# Patient Record
Sex: FEMALE | Race: WHITE | NOT HISPANIC OR LATINO | Employment: FULL TIME | URBAN - METROPOLITAN AREA
[De-identification: names, ages, dates, MRNs, and addresses within clinical notes are randomized per-mention and may not be internally consistent; named-entity substitution may affect disease eponyms.]

---

## 2019-05-06 ENCOUNTER — OFFICE VISIT (OUTPATIENT)
Dept: OBSTETRICS AND GYNECOLOGY | Age: 59
End: 2019-05-06

## 2019-05-06 VITALS
DIASTOLIC BLOOD PRESSURE: 60 MMHG | WEIGHT: 120 LBS | SYSTOLIC BLOOD PRESSURE: 102 MMHG | BODY MASS INDEX: 22.66 KG/M2 | HEIGHT: 61 IN

## 2019-05-06 DIAGNOSIS — Z12.4 PAP SMEAR FOR CERVICAL CANCER SCREENING: ICD-10-CM

## 2019-05-06 DIAGNOSIS — F17.200 SMOKER UNMOTIVATED TO QUIT: ICD-10-CM

## 2019-05-06 DIAGNOSIS — Z01.419 WELL FEMALE EXAM WITH ROUTINE GYNECOLOGICAL EXAM: Primary | ICD-10-CM

## 2019-05-06 DIAGNOSIS — Z13.89 SCREENING FOR BLOOD OR PROTEIN IN URINE: ICD-10-CM

## 2019-05-06 LAB
BILIRUB BLD-MCNC: NEGATIVE MG/DL
CLARITY, POC: CLEAR
COLOR UR: YELLOW
GLUCOSE UR STRIP-MCNC: NEGATIVE MG/DL
KETONES UR QL: NEGATIVE
LEUKOCYTE EST, POC: NEGATIVE
NITRITE UR-MCNC: NEGATIVE MG/ML
PH UR: 5.5 [PH] (ref 5–8)
PROT UR STRIP-MCNC: NEGATIVE MG/DL
RBC # UR STRIP: NEGATIVE /UL
SP GR UR: 1.02 (ref 1–1.03)
UROBILINOGEN UR QL: NORMAL

## 2019-05-06 PROCEDURE — 99396 PREV VISIT EST AGE 40-64: CPT | Performed by: OBSTETRICS & GYNECOLOGY

## 2019-05-06 PROCEDURE — 81002 URINALYSIS NONAUTO W/O SCOPE: CPT | Performed by: OBSTETRICS & GYNECOLOGY

## 2019-05-09 LAB
CYTOLOGIST CVX/VAG CYTO: NORMAL
CYTOLOGY CVX/VAG DOC CYTO: NORMAL
CYTOLOGY CVX/VAG DOC THIN PREP: NORMAL
DX ICD CODE: NORMAL
HIV 1 & 2 AB SER-IMP: NORMAL
HPV I/H RISK 4 DNA CVX QL PROBE+SIG AMP: NEGATIVE
Lab: NORMAL
OTHER STN SPEC: NORMAL
STAT OF ADQ CVX/VAG CYTO-IMP: NORMAL

## 2019-05-10 ENCOUNTER — TELEPHONE (OUTPATIENT)
Dept: OBSTETRICS AND GYNECOLOGY | Age: 59
End: 2019-05-10

## 2020-12-14 ENCOUNTER — OFFICE VISIT (OUTPATIENT)
Dept: OBSTETRICS AND GYNECOLOGY | Age: 60
End: 2020-12-14

## 2020-12-14 ENCOUNTER — RESULTS ENCOUNTER (OUTPATIENT)
Dept: OBSTETRICS AND GYNECOLOGY | Age: 60
End: 2020-12-14

## 2020-12-14 VITALS
SYSTOLIC BLOOD PRESSURE: 120 MMHG | DIASTOLIC BLOOD PRESSURE: 78 MMHG | WEIGHT: 141 LBS | HEIGHT: 61 IN | BODY MASS INDEX: 26.62 KG/M2

## 2020-12-14 DIAGNOSIS — Z01.419 WELL FEMALE EXAM WITH ROUTINE GYNECOLOGICAL EXAM: Primary | ICD-10-CM

## 2020-12-14 DIAGNOSIS — F17.200 SMOKER UNMOTIVATED TO QUIT: ICD-10-CM

## 2020-12-14 DIAGNOSIS — Z13.89 SCREENING FOR BLOOD OR PROTEIN IN URINE: ICD-10-CM

## 2020-12-14 DIAGNOSIS — J43.8 OTHER EMPHYSEMA (HCC): ICD-10-CM

## 2020-12-14 DIAGNOSIS — Z12.11 SCREENING FOR COLON CANCER: ICD-10-CM

## 2020-12-14 DIAGNOSIS — Z12.31 SCREENING MAMMOGRAM, ENCOUNTER FOR: ICD-10-CM

## 2020-12-14 PROBLEM — J44.9 COPD (CHRONIC OBSTRUCTIVE PULMONARY DISEASE): Status: RESOLVED | Noted: 2020-10-28 | Resolved: 2020-12-14

## 2020-12-14 PROBLEM — Z86.16 HISTORY OF 2019 NOVEL CORONAVIRUS DISEASE (COVID-19): Status: RESOLVED | Noted: 2020-10-28 | Resolved: 2020-12-14

## 2020-12-14 PROBLEM — J43.9 EMPHYSEMA OF LUNG (HCC): Status: ACTIVE | Noted: 2020-10-28

## 2020-12-14 PROBLEM — Z86.16 HISTORY OF 2019 NOVEL CORONAVIRUS DISEASE (COVID-19): Status: ACTIVE | Noted: 2020-10-28

## 2020-12-14 PROBLEM — J44.9 COPD (CHRONIC OBSTRUCTIVE PULMONARY DISEASE) (HCC): Status: ACTIVE | Noted: 2020-10-28

## 2020-12-14 LAB
BILIRUB BLD-MCNC: NEGATIVE MG/DL
CLARITY, POC: CLEAR
COLOR UR: YELLOW
GLUCOSE UR STRIP-MCNC: NEGATIVE MG/DL
KETONES UR QL: NEGATIVE
LEUKOCYTE EST, POC: ABNORMAL
NITRITE UR-MCNC: NEGATIVE MG/ML
PH UR: 5.5 [PH] (ref 5–8)
PROT UR STRIP-MCNC: NEGATIVE MG/DL
RBC # UR STRIP: NEGATIVE /UL
SP GR UR: 1.03 (ref 1–1.03)
UROBILINOGEN UR QL: NORMAL

## 2020-12-14 PROCEDURE — 81002 URINALYSIS NONAUTO W/O SCOPE: CPT | Performed by: OBSTETRICS & GYNECOLOGY

## 2020-12-14 PROCEDURE — 99396 PREV VISIT EST AGE 40-64: CPT | Performed by: OBSTETRICS & GYNECOLOGY

## 2020-12-14 NOTE — PROGRESS NOTES
Routine Annual Visit    2020    Patient: Justine Brower          MR#:[id number]    History of Present Illness    Chief Complaint   Patient presents with   • Gynecologic Exam     last pap 2019 neg, last MG 3/2019 normal, no c-scope       60 y.o. female  who presents for annual exam.    The patient presents for routine exam reporting her brother  a few months ago from [place].  The patient's quit smoking recently.    The patient reports some periodic urinary incontinence that seems worse in the last year.  She is not ready to do anything right now about it.      No LMP recorded (lmp unknown). Patient is postmenopausal.  Obstetric History:  OB History        1    Para   1    Term   1            AB        Living   1       SAB        TAB        Ectopic        Molar        Multiple        Live Births   1               Menstrual History:     No LMP recorded (lmp unknown). Patient is postmenopausal.       Sexual History:       ________________________________________  Patient Active Problem List   Diagnosis   • Smoker unmotivated to quit   • Emphysema of lung (CMS/HCC)     Past Medical History:   Diagnosis Date   • GERD (gastroesophageal reflux disease)    • History of 2019 novel coronavirus disease (COVID-19) 10/28/2020    2020   • LGSIL on Pap smear of cervix 02/15/1999   • Menopause    • Pap smear of cervix with ASCUS, cannot exclude HGSIL 1999   • Vaginal atrophy      Past Surgical History:   Procedure Laterality Date   • BREAST EXCISIONAL BIOPSY Left 1999    DR. HANSEN/[facility]   • CARPAL TUNNEL RELEASE Right    • COLPOSCOPY W/ BIOPSY / CURETTAGE  1999    chronic cervicitis   • D&C HYSTEROSCOPY  2006    DR. ROBIN/S   • ENDOMETRIAL BIOPSY  08/10/1999    secretory endometrium   • NOSE SURGERY     • TONSILLECTOMY     • TUBAL ABDOMINAL LIGATION       Social History     Tobacco Use   Smoking Status Former Smoker   • Packs/day: 1.00   • Years: 49.00  "  • Pack years: 49.00   • Types: Cigarettes   • Start date:    • Quit date:    • Years since quittin.9   Smokeless Tobacco Never Used     Family History   Problem Relation Age of Onset   • Hypertension Mother    • No Known Problems Father    • Heart attack Brother      Prior to Admission medications    Medication Sig Start Date End Date Taking? Authorizing Provider   albuterol sulfate  (90 Base) MCG/ACT inhaler  20  Yes Emergency, Nurse Valentín RN   omeprazole (priLOSEC) 20 MG capsule Take 20 mg by mouth Daily As Needed.   Yes Emergency, Nurse Valentín RN   rosuvastatin (CRESTOR) 10 MG tablet  20  Yes Emergency, Nurse LUKAS Laura     ________________________________________    Current contraception: post menopausal status  History of abnormal Pap smear: no  Family history of uterine or ovarian cancer: no  Family History of colon cancer/colon polyps: no  History of abnormal mammogram: no  History of abnormal lipids: yes - treated    The following portions of the patient's history were reviewed and updated as appropriate: allergies, current medications, past family history, past medical history, past social history, past surgical history and problem list.    Review of Systems    Pertinent items are noted in HPI.       Objective   Physical Exam    /78   Ht 154.9 cm (61\")   Wt 64 kg (141 lb)   LMP  (LMP Unknown)   Breastfeeding No   BMI 26.64 kg/m²    BP Readings from Last 3 Encounters:   20 120/78   20 141/83   20 154/89      Wt Readings from Last 3 Encounters:   20 64 kg (141 lb)   20 57.2 kg (126 lb)   20 57.2 kg (126 lb)        BMI: Estimated body mass index is 26.64 kg/m² as calculated from the following:    Height as of this encounter: 154.9 cm (61\").    Weight as of this encounter: 64 kg (141 lb).       General: alert, appears older than stated age and cooperative   Heart: regular rate and rhythm, S1, S2 normal, no murmur, click, rub or gallop "   Lungs: clear to auscultation bilaterally   Abdomen: soft, non-tender, without masses, no organomegaly   Breast: inspection negative, no nipple discharge or bleeding, no masses or nodularity palpable   External genitalia/Vulva: External genitalia including bartholin's glands, Urethra, [place]'s gland and urethra meatus are normal, Perineum, rectum and anus appear normal  and Bladder appears normal without significant prolapse    Vagina: normal mucosa, normal discharge   Cervix: no lesions   Uterus: normal size   Adnexa: normal adnexa     As part of wellness and prevention, the following topics were discussed with the patient:  Encouraged self breast exam  Physical activity and regular exercised encouraged.   Smoking cessation discussed.      Assessment:    Diagnoses and all orders for this visit:    1. Well female exam with routine gynecological exam (Primary)    2. Screening for colon cancer  -     Cologuard - Stool, Per Rectum; Future    3. Other emphysema (CMS/HCC)    4. Smoker unmotivated to quit    5. Screening for blood or protein in urine  -     POC Urinalysis Dipstick    6. Screening mammogram, encounter for        Plan:  No follow-ups on file.      Long Lawler MD  12/14/2020 13:25 EST

## 2020-12-16 LAB
CYTOLOGIST CVX/VAG CYTO: NORMAL
CYTOLOGY CVX/VAG DOC CYTO: NORMAL
CYTOLOGY CVX/VAG DOC THIN PREP: NORMAL
DX ICD CODE: NORMAL
HIV 1 & 2 AB SER-IMP: NORMAL
HPV I/H RISK 4 DNA CVX QL PROBE+SIG AMP: NEGATIVE
OTHER STN SPEC: NORMAL
STAT OF ADQ CVX/VAG CYTO-IMP: NORMAL

## 2022-11-17 ENCOUNTER — OFFICE VISIT (OUTPATIENT)
Dept: OBSTETRICS AND GYNECOLOGY | Age: 62
End: 2022-11-17

## 2022-11-17 VITALS
WEIGHT: 153.8 LBS | DIASTOLIC BLOOD PRESSURE: 78 MMHG | BODY MASS INDEX: 29.04 KG/M2 | HEIGHT: 61 IN | SYSTOLIC BLOOD PRESSURE: 124 MMHG

## 2022-11-17 DIAGNOSIS — Z01.411 ENCOUNTER FOR GYNECOLOGICAL EXAMINATION WITH ABNORMAL FINDING: Primary | ICD-10-CM

## 2022-11-17 DIAGNOSIS — Z12.4 SCREENING FOR MALIGNANT NEOPLASM OF CERVIX: ICD-10-CM

## 2022-11-17 DIAGNOSIS — Z11.51 SCREENING FOR HUMAN PAPILLOMAVIRUS (HPV): ICD-10-CM

## 2022-11-17 DIAGNOSIS — R39.15 URINARY URGENCY: ICD-10-CM

## 2022-11-17 DIAGNOSIS — N39.41 URGE URINARY INCONTINENCE: ICD-10-CM

## 2022-11-17 DIAGNOSIS — L98.8 SKIN MACULE: ICD-10-CM

## 2022-11-17 PROBLEM — K21.9 GASTROESOPHAGEAL REFLUX DISEASE: Status: ACTIVE | Noted: 2021-06-17

## 2022-11-17 PROBLEM — R91.1 PULMONARY NODULE LESS THAN 6 MM IN DIAMETER WITH LOW RISK FOR MALIGNANT NEOPLASM: Status: ACTIVE | Noted: 2021-09-01

## 2022-11-17 PROBLEM — J31.0 CHRONIC RHINITIS: Status: ACTIVE | Noted: 2021-06-17

## 2022-11-17 PROBLEM — E78.5 HYPERLIPIDEMIA: Status: ACTIVE | Noted: 2021-06-17

## 2022-11-17 PROBLEM — E04.1 THYROID NODULE: Status: ACTIVE | Noted: 2021-12-07

## 2022-11-17 PROBLEM — Z91.89 PULMONARY NODULE LESS THAN 6 MM IN DIAMETER WITH LOW RISK FOR MALIGNANT NEOPLASM: Status: ACTIVE | Noted: 2021-09-01

## 2022-11-17 PROCEDURE — 99396 PREV VISIT EST AGE 40-64: CPT | Performed by: NURSE PRACTITIONER

## 2022-11-17 PROCEDURE — 99213 OFFICE O/P EST LOW 20 MIN: CPT | Performed by: NURSE PRACTITIONER

## 2022-11-17 RX ORDER — LORATADINE 10 MG/1
10 TABLET ORAL DAILY
COMMUNITY

## 2022-11-17 RX ORDER — ACETAMINOPHEN 500 MG
500 TABLET ORAL
COMMUNITY
Start: 2022-05-29

## 2022-11-17 RX ORDER — FAMOTIDINE 20 MG/1
TABLET, FILM COATED ORAL
COMMUNITY
Start: 2022-11-10 | End: 2022-11-17

## 2022-11-17 RX ORDER — OMEPRAZOLE 40 MG/1
CAPSULE, DELAYED RELEASE ORAL
COMMUNITY
Start: 2022-11-09

## 2022-11-17 RX ORDER — NAPROXEN 500 MG/1
TABLET ORAL
COMMUNITY
Start: 2022-10-25

## 2022-11-17 RX ORDER — GUAIFENESIN, DEXTROMETHORPHAN HBR 600; 30 MG/1; MG/1
TABLET ORAL
COMMUNITY
Start: 2022-11-10

## 2022-11-17 RX ORDER — PREDNISONE 10 MG/1
TABLET ORAL
COMMUNITY
Start: 2022-11-10

## 2022-11-17 RX ORDER — DEXAMETHASONE 4 MG/1
TABLET ORAL
COMMUNITY
Start: 2022-11-10

## 2022-11-17 NOTE — PROGRESS NOTES
[facility]   Obstetrics and Gynecology       2022    Patient: Justine Brower          MR#:[id number]    History of Present Illness    Chief Complaint   Patient presents with   • Gynecologic Exam     annual exam, last pap 20 (-), mammo scheduled next Tuesday, no complaints       62 y.o. female  who presents for annual exam.She is sexually active, no pain. No vaginal or breast complaints today. She quit smoking in 2019 and continues to be smoke free. Cologard 21- negative.Mxr scheduled for next Tuesday. She does have occasional urinary incontinence with urgency, dribbles. Not wanting to do anything about it at this point.    Studies reviewed:    No LMP recorded (lmp unknown). Patient is postmenopausal.  Obstetric History:  OB History        1    Para   1    Term   1            AB        Living   1       SAB        IAB        Ectopic        Molar        Multiple        Live Births   1               Menstrual History:     No LMP recorded (lmp unknown). Patient is postmenopausal.       Sexual History:       ________________________________________  Patient Active Problem List   Diagnosis   • Smoker unmotivated to quit   • Emphysema of lung (HCC)   • Chronic rhinitis   • Gastroesophageal reflux disease   • Hyperlipidemia   • Pulmonary nodule less than 6 mm in diameter with low risk for malignant neoplasm   • Thyroid nodule     Past Medical History:   Diagnosis Date   • GERD (gastroesophageal reflux disease)    • History of 2019 novel coronavirus disease (COVID-19) 10/28/2020    2020   • LGSIL on Pap smear of cervix 02/15/1999   • Menopause    • Pap smear of cervix with ASCUS, cannot exclude HGSIL 1999   • Vaginal atrophy      Past Surgical History:   Procedure Laterality Date   • BREAST EXCISIONAL BIOPSY Left 1999    DR. HANSEN/[facility]   • CARPAL TUNNEL RELEASE Right    • COLPOSCOPY W/ BIOPSY / CURETTAGE  1999    chronic cervicitis   • D & C  HYSTEROSCOPY  12/27/2006    DR. ROBIN/S   • ENDOMETRIAL BIOPSY  08/10/1999    secretory endometrium   • NOSE SURGERY  1982   • TONSILLECTOMY  1975   • TUBAL ABDOMINAL LIGATION  1985     Social History     Tobacco Use   Smoking Status Former   • Packs/day: 1.00   • Years: 49.00   • Pack years: 49.00   • Types: Cigarettes   • Start date: 1975   • Quit date: 2019   • Years since quitting: 3.8   Smokeless Tobacco Never     Family History   Problem Relation Age of Onset   • Hypertension Mother    • No Known Problems Father    • Heart attack Brother      Prior to Admission medications    Medication Sig Start Date End Date Taking? Authorizing Provider   acetaminophen (TYLENOL) 500 MG tablet Take 500 mg by mouth. 5/29/22  Yes Provider, Historical, MD   albuterol sulfate  (90 Base) MCG/ACT inhaler  6/26/20  Yes Emergency, Nurse Epic, RN   Dextromethorphan-guaiFENesin (Mucus Relief DM)  MG tablet sustained-release 12 hour  11/10/22  Yes Provider, Historical, MD   Flovent  MCG/ACT inhaler  11/10/22  Yes Provider, Historical, MD   loratadine (CLARITIN) 10 MG tablet Take 10 mg by mouth Daily.   Yes Provider, Historical, MD   naproxen (NAPROSYN) 500 MG tablet  10/25/22  Yes Provider, Historical, MD   omeprazole (priLOSEC) 40 MG capsule  11/9/22  Yes Provider, Historical, MD   predniSONE (DELTASONE) 10 MG tablet  11/10/22  Yes Provider, Historical, MD   rosuvastatin (CRESTOR) 10 MG tablet  6/17/20  Yes Emergency, Nurse LUKAS Laura   famotidine (PEPCID) 20 MG tablet  11/10/22 11/17/22  Provider, Historical, MD   omeprazole (priLOSEC) 20 MG capsule Take 20 mg by mouth Daily As Needed.  11/17/22  Emergency, Nurse LUKAS Laura     ________________________________________    Current contraception: post menopausal status  History of abnormal Pap smear: no  Family history of uterine or ovarian cancer: no  Family History of colon cancer/colon polyps: no  History of abnormal mammogram: no    The following portions of the  "patient's history were reviewed and updated as appropriate: allergies, current medications, past family history, past medical history, past social history, past surgical history, and problem list.    Review of Systems   Constitutional: Negative for activity change, appetite change, chills, fatigue and fever.   Respiratory: Negative for cough and shortness of breath.    Cardiovascular: Negative for chest pain.   Gastrointestinal: Negative for constipation, diarrhea, nausea and vomiting.   Genitourinary: Negative for dysuria, flank pain, genital sores, hematuria, menstrual problem and vaginal bleeding.            Objective    Physical Exam  Constitutional:       Appearance: Normal appearance.   Genitourinary:      Vulva, bladder, rectum and urethral meatus normal.      Right Labia: No rash, tenderness, lesions or skin changes.     Left Labia: No tenderness, lesions, skin changes or rash.     Mild vaginal atrophy present.       Right Adnexa: not tender and no mass present.     Left Adnexa: not tender and no mass present.     No cervical motion tenderness, discharge or lesion.      Uterus is not enlarged, tender or prolapsed.   HENT:      Head: Normocephalic and atraumatic.   Chest:      Comments: Breast exam WNL.   Macule to L breast at 9:00, irregular borders, flat  Abdominal:      Palpations: Abdomen is soft.   Musculoskeletal:      Cervical back: Normal range of motion.   Neurological:      Mental Status: She is alert.   Skin:     General: Skin is warm and dry.   Psychiatric:         Mood and Affect: Mood normal.   Vitals reviewed.           /78   Ht 154.9 cm (61\")   Wt 69.8 kg (153 lb 12.8 oz)   LMP  (LMP Unknown)   BMI 29.06 kg/m²    BP Readings from Last 3 Encounters:   11/17/22 124/78   12/14/20 120/78   07/03/20 141/83      Wt Readings from Last 3 Encounters:   11/17/22 69.8 kg (153 lb 12.8 oz)   12/14/20 64 kg (141 lb)   07/03/20 57.2 kg (126 lb)        BMI: Estimated body mass index is 29.06 kg/m² " "as calculated from the following:    Height as of this encounter: 154.9 cm (61\").    Weight as of this encounter: 69.8 kg (153 lb 12.8 oz).    Counseling:  --Nutrition: Stressed importance of moderation and caloric balance, stressed fresh fruit and vegetables  --Exercise: Stressed the importance of regular exercise. 3-5 times weekly   - Discussed screening mammogram recommendations.   --Discussed benefits of screening colonoscopy- age 45 unless FH  --Discussed pap smear screening recommendations             Assessment:  Diagnoses and all orders for this visit:    1. Encounter for gynecological examination without abnormal finding (Primary)    2. Screening for malignant neoplasm of cervix  -     IGP,CtNgTv,Apt HPV,rfx 16 / 18,45    3. Screening for human papillomavirus (HPV)  -     IGP,CtNgTv,Apt HPV,rfx 16 / 18,45    4. Urinary urgency  -     Urine Culture - Urine, Urine, Clean Catch    5. Skin macule  -     Ambulatory Referral to Dermatology        Plan:  Return for Annual physical with Dr. Lawler.    Palmira Bae, APRN  11/17/2022 11:49 EST   "

## 2022-11-26 LAB
C TRACH RRNA CVX QL NAA+PROBE: NEGATIVE
CYTOLOGIST CVX/VAG CYTO: NORMAL
CYTOLOGY CVX/VAG DOC CYTO: NORMAL
CYTOLOGY CVX/VAG DOC THIN PREP: NORMAL
DX ICD CODE: NORMAL
HIV 1 & 2 AB SER-IMP: NORMAL
HPV GENOTYPE REFLEX: NORMAL
HPV I/H RISK 4 DNA CVX QL PROBE+SIG AMP: NEGATIVE
N GONORRHOEA RRNA CVX QL NAA+PROBE: NEGATIVE
OTHER STN SPEC: NORMAL
STAT OF ADQ CVX/VAG CYTO-IMP: NORMAL
T VAGINALIS RRNA SPEC QL NAA+PROBE: NEGATIVE